# Patient Record
Sex: MALE | Race: OTHER | HISPANIC OR LATINO
[De-identification: names, ages, dates, MRNs, and addresses within clinical notes are randomized per-mention and may not be internally consistent; named-entity substitution may affect disease eponyms.]

---
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
## 2024-01-22 ENCOUNTER — MED ADMIN CHARGE (OUTPATIENT)
Age: 19
End: 2024-01-22

## 2024-01-22 ENCOUNTER — OUTPATIENT (OUTPATIENT)
Dept: OUTPATIENT SERVICES | Facility: HOSPITAL | Age: 19
LOS: 1 days | End: 2024-01-22
Payer: MEDICAID

## 2024-01-22 ENCOUNTER — APPOINTMENT (OUTPATIENT)
Dept: PEDIATRIC ADOLESCENT MEDICINE | Facility: CLINIC | Age: 19
End: 2024-01-22
Payer: MEDICAID

## 2024-01-22 VITALS
RESPIRATION RATE: 20 BRPM | SYSTOLIC BLOOD PRESSURE: 130 MMHG | HEIGHT: 70 IN | BODY MASS INDEX: 28.63 KG/M2 | WEIGHT: 200 LBS | HEART RATE: 76 BPM | TEMPERATURE: 98.1 F | DIASTOLIC BLOOD PRESSURE: 70 MMHG

## 2024-01-22 DIAGNOSIS — R41.89 OTHER SYMPTOMS AND SIGNS INVOLVING COGNITIVE FUNCTIONS AND AWARENESS: ICD-10-CM

## 2024-01-22 DIAGNOSIS — Z23 ENCOUNTER FOR IMMUNIZATION: ICD-10-CM

## 2024-01-22 DIAGNOSIS — F84.0 AUTISTIC DISORDER: ICD-10-CM

## 2024-01-22 DIAGNOSIS — F90.9 ATTENTION-DEFICIT HYPERACTIVITY DISORDER, UNSPECIFIED TYPE: ICD-10-CM

## 2024-01-22 DIAGNOSIS — Z00.00 ENCOUNTER FOR GENERAL ADULT MEDICAL EXAMINATION WITHOUT ABNORMAL FINDINGS: ICD-10-CM

## 2024-01-22 PROCEDURE — 80061 LIPID PANEL: CPT

## 2024-01-22 PROCEDURE — 82465 ASSAY BLD/SERUM CHOLESTEROL: CPT

## 2024-01-22 PROCEDURE — 90686 IIV4 VACC NO PRSV 0.5 ML IM: CPT

## 2024-01-22 PROCEDURE — 99214 OFFICE O/P EST MOD 30 MIN: CPT

## 2024-01-22 PROCEDURE — 84443 ASSAY THYROID STIM HORMONE: CPT

## 2024-01-22 PROCEDURE — 85027 COMPLETE CBC AUTOMATED: CPT

## 2024-01-22 PROCEDURE — 83036 HEMOGLOBIN GLYCOSYLATED A1C: CPT

## 2024-01-22 PROCEDURE — 36415 COLL VENOUS BLD VENIPUNCTURE: CPT

## 2024-01-22 NOTE — DISCUSSION/SUMMARY
[FreeTextEntry1] : 17 yo M with Autism, ADHD, and cognitive impairment. Had a CPE in may so not due yet. Needs assistance in psych/neuropsych eval. Has also not had blood work done in 3 years. PE unremarkable. Due for Flu shot.  Plan: -Psych referral given -Neuropsych referral given -Meet with Social work in  2 weeks at next visit, to assist if needed with appointments and any  -Labs: CBC, Lipid, A1C, TSH w/RT4 -Flu shot - Post vaccine care discussed & potential side effects reviewed - Tylenol every 4 hours prn for fever or pain and or Motrin every 6 hours prn for fever or pain -F/U in 2 weeks to review establishment of evaluation and services with psych or neuropsych as well as f/u on blood work  Caretaker expressed understanding of the plan and agrees. All questions were answered.

## 2024-01-22 NOTE — HISTORY OF PRESENT ILLNESS
[FreeTextEntry6] : 19 yo M with Autism and ADHD here for "check up" with grandmother who is pt's ayleen. Grandmother states pt will be hopefully enrolling into an  program next academic year and this year she has been doing her best to keep him busy with activities. He runs outside, likes to play soccer. Does not have friends though and spends much time with family.  Grandma is concerned that over the past couple of years pt has started speaking to himself and its progressively gotten worse. Schizophrenia and Bipolar disorder run in the family.  I spoke to Moo briefly alone- he feels safe at home, loves his grandma, denies suicidal ideations, denies homicidal ideations, and denies AVH.  Grandmother would like him professionally evaluated to best assess what his capabilities are and what she can do to best help him live his adult life.  Moo reports feeling well today and that he eats a balanced diet and gets exercise.

## 2024-01-23 DIAGNOSIS — R41.89 OTHER SYMPTOMS AND SIGNS INVOLVING COGNITIVE FUNCTIONS AND AWARENESS: ICD-10-CM

## 2024-01-23 DIAGNOSIS — F84.0 AUTISTIC DISORDER: ICD-10-CM

## 2024-01-23 DIAGNOSIS — F90.9 ATTENTION-DEFICIT HYPERACTIVITY DISORDER, UNSPECIFIED TYPE: ICD-10-CM

## 2024-01-23 LAB
BASOPHILS # BLD AUTO: 0.04 K/UL
BASOPHILS NFR BLD AUTO: 0.6 %
EOSINOPHIL # BLD AUTO: 0.15 K/UL
EOSINOPHIL NFR BLD AUTO: 2.4 %
HCT VFR BLD CALC: 49.8 %
HGB BLD-MCNC: 16.1 G/DL
IMM GRANULOCYTES NFR BLD AUTO: 0.3 %
LYMPHOCYTES # BLD AUTO: 2.25 K/UL
LYMPHOCYTES NFR BLD AUTO: 35.4 %
MAN DIFF?: NORMAL
MCHC RBC-ENTMCNC: 30.7 PG
MCHC RBC-ENTMCNC: 32.3 G/DL
MCV RBC AUTO: 95 FL
MONOCYTES # BLD AUTO: 0.74 K/UL
MONOCYTES NFR BLD AUTO: 11.6 %
NEUTROPHILS # BLD AUTO: 3.16 K/UL
NEUTROPHILS NFR BLD AUTO: 49.7 %
PLATELET # BLD AUTO: 205 K/UL
RBC # BLD: 5.24 M/UL
RBC # FLD: 11.9 %
WBC # FLD AUTO: 6.36 K/UL

## 2024-01-24 LAB
CHOLEST SERPL-MCNC: 166 MG/DL
CHOLEST SERPL-MCNC: 167 MG/DL
ESTIMATED AVERAGE GLUCOSE: 91 MG/DL
HBA1C MFR BLD HPLC: 4.8 %
HDLC SERPL-MCNC: 53 MG/DL
LDLC SERPL CALC-MCNC: 99 MG/DL
NONHDLC SERPL-MCNC: 114 MG/DL
TRIGL SERPL-MCNC: 76 MG/DL
TSH SERPL-ACNC: 1.4 UIU/ML

## 2024-02-05 ENCOUNTER — APPOINTMENT (OUTPATIENT)
Dept: PEDIATRIC ADOLESCENT MEDICINE | Facility: CLINIC | Age: 19
End: 2024-02-05

## 2024-03-11 ENCOUNTER — OUTPATIENT (OUTPATIENT)
Dept: OUTPATIENT SERVICES | Facility: HOSPITAL | Age: 19
LOS: 1 days | End: 2024-03-11
Payer: MEDICAID

## 2024-03-11 ENCOUNTER — APPOINTMENT (OUTPATIENT)
Dept: PEDIATRIC ADOLESCENT MEDICINE | Facility: CLINIC | Age: 19
End: 2024-03-11
Payer: MEDICAID

## 2024-03-11 VITALS
HEART RATE: 78 BPM | DIASTOLIC BLOOD PRESSURE: 59 MMHG | SYSTOLIC BLOOD PRESSURE: 89 MMHG | HEIGHT: 70 IN | WEIGHT: 198 LBS | TEMPERATURE: 96 F | RESPIRATION RATE: 20 BRPM | BODY MASS INDEX: 28.35 KG/M2

## 2024-03-11 DIAGNOSIS — Z00.00 ENCOUNTER FOR GENERAL ADULT MEDICAL EXAMINATION WITHOUT ABNORMAL FINDINGS: ICD-10-CM

## 2024-03-11 DIAGNOSIS — Z02.79 ENCOUNTER FOR ISSUE OF OTHER MEDICAL CERTIFICATE: ICD-10-CM

## 2024-03-11 DIAGNOSIS — Z71.9 COUNSELING, UNSPECIFIED: ICD-10-CM

## 2024-03-11 PROCEDURE — 99213 OFFICE O/P EST LOW 20 MIN: CPT

## 2024-03-11 NOTE — REVIEW OF SYSTEMS
[Fever] : no fever [Malaise] : no malaise [Difficulty with Sleep] : no difficulty with sleep [Headache] : no headache [Nasal Discharge] : no nasal discharge [Nasal Congestion] : no nasal congestion [Cough] : no cough [Congestion] : no congestion [Vomiting] : no vomiting [Diarrhea] : no diarrhea [Rash] : no rash

## 2024-03-11 NOTE — HISTORY OF PRESENT ILLNESS
[FreeTextEntry6] : 18-year-old M with ADHD here for follow up and to get forms filled out. Pt has been doing well. no complaints or concerns. He was last seen in January and had blood work done with referrals to Psych and Neuropsych given to him at that time to establish care for counseling and ADHD. He has not been able to see them yet. Forms today are for grandmother to have foster care over her granddaughter (Moo's 2y/o sister). Moo spends his time playing soccer and basketball at the park with his friend but would like to join a team. He is applying to [employer] in August and is currently studying for it.  He has been sleeping well, exercising daily in the morning and eating healthy.

## 2024-03-11 NOTE — BEGINNING OF VISIT
[Patient] : patient [Other: ____] : [unfilled] [Foster Parents/Guardian] : /guardian [FreeTextEntry1] : Grandmother

## 2024-03-11 NOTE — DISCUSSION/SUMMARY
[FreeTextEntry1] : 18-year-old M with ADHD here for follow up and has forms to be filled out. He has been doing well without complaints or concerns. He is getting adequate sleep, has a balanced diet and currently spends his time playing basketball, soccer and by studying to be an .  - Forms filled out and given to grandmother - Confirmed Pt still has referrals to Psych and Neuropsych - reviewed lab work in January  - Given list of intramural soccer/basketball leagues to participate in team sports per pt wishes  - RTC as needed or for WCC   Patient confirmed understanding and agreement with plan

## 2024-03-12 DIAGNOSIS — Z71.9 COUNSELING, UNSPECIFIED: ICD-10-CM

## 2024-03-12 DIAGNOSIS — Z02.79 ENCOUNTER FOR ISSUE OF OTHER MEDICAL CERTIFICATE: ICD-10-CM

## 2024-03-29 NOTE — ED ADULT NURSE NOTE - NS ED NOTE ABUSE RESPONSE YN
The patient is Stable - Low risk of patient condition declining or worsening         Progress made toward(s) clinical / shift goals:    Problem: Knowledge Deficit - Standard  Goal: Patient and family/care givers will demonstrate understanding of plan of care, disease process/condition, diagnostic tests and medications  Outcome: Progressing       Patient is not progressing towards the following goals:       Yes

## 2024-05-14 ENCOUNTER — OUTPATIENT (OUTPATIENT)
Dept: OUTPATIENT SERVICES | Facility: HOSPITAL | Age: 19
LOS: 1 days | End: 2024-05-14
Payer: MEDICAID

## 2024-05-14 ENCOUNTER — APPOINTMENT (OUTPATIENT)
Dept: NEUROPSYCHOLOGY | Facility: CLINIC | Age: 19
End: 2024-05-14

## 2024-05-14 DIAGNOSIS — F84.0 AUTISTIC DISORDER: ICD-10-CM

## 2024-05-14 DIAGNOSIS — G31.84 MILD COGNITIVE IMPAIRMENT OF UNCERTAIN OR UNKNOWN ETIOLOGY: ICD-10-CM

## 2024-05-14 PROCEDURE — 90837 PSYTX W PT 60 MINUTES: CPT | Mod: 95

## 2024-05-14 PROCEDURE — 90791 PSYCH DIAGNOSTIC EVALUATION: CPT | Mod: 95

## 2024-05-15 DIAGNOSIS — F84.0 AUTISTIC DISORDER: ICD-10-CM

## 2024-06-03 ENCOUNTER — OUTPATIENT (OUTPATIENT)
Dept: OUTPATIENT SERVICES | Facility: HOSPITAL | Age: 19
LOS: 1 days | End: 2024-06-03
Payer: MEDICAID

## 2024-06-03 ENCOUNTER — APPOINTMENT (OUTPATIENT)
Dept: NEUROPSYCHOLOGY | Facility: CLINIC | Age: 19
End: 2024-06-03

## 2024-06-03 DIAGNOSIS — G31.84 MILD COGNITIVE IMPAIRMENT OF UNCERTAIN OR UNKNOWN ETIOLOGY: ICD-10-CM

## 2024-06-03 PROCEDURE — 96132 NRPSYC TST EVAL PHYS/QHP 1ST: CPT

## 2024-06-03 PROCEDURE — 96138 PSYCL/NRPSYC TECH 1ST: CPT

## 2024-06-03 PROCEDURE — 96133 NRPSYC TST EVAL PHYS/QHP EA: CPT

## 2024-06-03 PROCEDURE — 96139 PSYCL/NRPSYC TST TECH EA: CPT

## 2024-06-04 DIAGNOSIS — G31.84 MILD COGNITIVE IMPAIRMENT OF UNCERTAIN OR UNKNOWN ETIOLOGY: ICD-10-CM

## 2024-07-17 ENCOUNTER — APPOINTMENT (OUTPATIENT)
Dept: NEUROPSYCHOLOGY | Facility: CLINIC | Age: 19
End: 2024-07-17

## 2024-07-17 ENCOUNTER — OUTPATIENT (OUTPATIENT)
Dept: OUTPATIENT SERVICES | Facility: HOSPITAL | Age: 19
LOS: 1 days | Discharge: HOME | End: 2024-07-17
Payer: MEDICAID

## 2024-07-17 DIAGNOSIS — G31.84 MILD COGNITIVE IMPAIRMENT OF UNCERTAIN OR UNKNOWN ETIOLOGY: ICD-10-CM

## 2024-07-17 PROCEDURE — 96133 NRPSYC TST EVAL PHYS/QHP EA: CPT

## 2024-07-17 PROCEDURE — 96132 NRPSYC TST EVAL PHYS/QHP 1ST: CPT

## 2024-07-18 DIAGNOSIS — G31.84 MILD COGNITIVE IMPAIRMENT OF UNCERTAIN OR UNKNOWN ETIOLOGY: ICD-10-CM

## 2024-09-18 ENCOUNTER — APPOINTMENT (OUTPATIENT)
Dept: PEDIATRIC ADOLESCENT MEDICINE | Facility: CLINIC | Age: 19
End: 2024-09-18

## 2024-11-15 ENCOUNTER — OUTPATIENT (OUTPATIENT)
Dept: OUTPATIENT SERVICES | Facility: HOSPITAL | Age: 19
LOS: 1 days | End: 2024-11-15
Payer: MEDICAID

## 2024-11-15 ENCOUNTER — APPOINTMENT (OUTPATIENT)
Dept: PEDIATRIC ADOLESCENT MEDICINE | Facility: CLINIC | Age: 19
End: 2024-11-15
Payer: MEDICAID

## 2024-11-15 VITALS
RESPIRATION RATE: 19 BRPM | BODY MASS INDEX: 27.49 KG/M2 | WEIGHT: 192 LBS | TEMPERATURE: 98.4 F | HEIGHT: 70 IN | DIASTOLIC BLOOD PRESSURE: 76 MMHG | SYSTOLIC BLOOD PRESSURE: 119 MMHG | HEART RATE: 58 BPM

## 2024-11-15 DIAGNOSIS — Z71.89 OTHER SPECIFIED COUNSELING: ICD-10-CM

## 2024-11-15 DIAGNOSIS — Z71.3 DIETARY COUNSELING AND SURVEILLANCE: ICD-10-CM

## 2024-11-15 DIAGNOSIS — R10.31 RIGHT LOWER QUADRANT PAIN: ICD-10-CM

## 2024-11-15 DIAGNOSIS — R10.32 RIGHT LOWER QUADRANT PAIN: ICD-10-CM

## 2024-11-15 DIAGNOSIS — Z00.00 ENCOUNTER FOR GENERAL ADULT MEDICAL EXAMINATION WITHOUT ABNORMAL FINDINGS: ICD-10-CM

## 2024-11-15 DIAGNOSIS — K59.00 CONSTIPATION, UNSPECIFIED: ICD-10-CM

## 2024-11-15 PROCEDURE — 99214 OFFICE O/P EST MOD 30 MIN: CPT | Mod: 25

## 2024-11-15 PROCEDURE — 99214 OFFICE O/P EST MOD 30 MIN: CPT

## 2024-11-15 RX ORDER — PSYLLIUM SEED
58.6 PACKET (EA) ORAL
Qty: 1 | Refills: 1 | Status: ACTIVE | COMMUNITY
Start: 2024-11-15 | End: 1900-01-01

## 2024-11-19 DIAGNOSIS — Z71.3 DIETARY COUNSELING AND SURVEILLANCE: ICD-10-CM

## 2024-11-19 DIAGNOSIS — K59.00 CONSTIPATION, UNSPECIFIED: ICD-10-CM

## 2024-11-19 DIAGNOSIS — Z87.19 PERSONAL HISTORY OF OTHER DISEASES OF THE DIGESTIVE SYSTEM: ICD-10-CM

## 2024-11-19 DIAGNOSIS — Z71.89 OTHER SPECIFIED COUNSELING: ICD-10-CM

## 2024-11-19 DIAGNOSIS — R10.31 RIGHT LOWER QUADRANT PAIN: ICD-10-CM

## 2024-11-20 ENCOUNTER — APPOINTMENT (OUTPATIENT)
Dept: PEDIATRIC ADOLESCENT MEDICINE | Facility: CLINIC | Age: 19
End: 2024-11-20

## 2024-12-11 ENCOUNTER — APPOINTMENT (OUTPATIENT)
Dept: PEDIATRIC ADOLESCENT MEDICINE | Facility: CLINIC | Age: 19
End: 2024-12-11

## 2024-12-22 NOTE — PHYSICAL EXAM
PT dc'd, vss, pt stable. Ambulated out of ER with family. DC instructions given, no questions or concerns at this time.    [NL] : warm, clear

## 2025-01-08 ENCOUNTER — APPOINTMENT (OUTPATIENT)
Dept: PEDIATRIC ADOLESCENT MEDICINE | Facility: CLINIC | Age: 20
End: 2025-01-08

## 2025-02-04 ENCOUNTER — APPOINTMENT (OUTPATIENT)
Dept: PEDIATRIC ADOLESCENT MEDICINE | Facility: CLINIC | Age: 20
End: 2025-02-04

## 2025-04-25 ENCOUNTER — APPOINTMENT (OUTPATIENT)
Dept: PEDIATRIC ADOLESCENT MEDICINE | Facility: CLINIC | Age: 20
End: 2025-04-25

## 2025-04-30 ENCOUNTER — OUTPATIENT (OUTPATIENT)
Dept: OUTPATIENT SERVICES | Facility: HOSPITAL | Age: 20
LOS: 1 days | End: 2025-04-30
Payer: MEDICAID

## 2025-04-30 ENCOUNTER — APPOINTMENT (OUTPATIENT)
Dept: PEDIATRIC ADOLESCENT MEDICINE | Facility: CLINIC | Age: 20
End: 2025-04-30
Payer: MEDICAID

## 2025-04-30 VITALS
SYSTOLIC BLOOD PRESSURE: 117 MMHG | RESPIRATION RATE: 20 BRPM | BODY MASS INDEX: 31.55 KG/M2 | TEMPERATURE: 98.2 F | HEIGHT: 69 IN | OXYGEN SATURATION: 97 % | DIASTOLIC BLOOD PRESSURE: 71 MMHG | WEIGHT: 213 LBS | HEART RATE: 72 BPM

## 2025-04-30 DIAGNOSIS — Z00.00 ENCOUNTER FOR GENERAL ADULT MEDICAL EXAMINATION WITHOUT ABNORMAL FINDINGS: ICD-10-CM

## 2025-04-30 DIAGNOSIS — Z71.89 OTHER SPECIFIED COUNSELING: ICD-10-CM

## 2025-04-30 DIAGNOSIS — R41.89 OTHER SYMPTOMS AND SIGNS INVOLVING COGNITIVE FUNCTIONS AND AWARENESS: ICD-10-CM

## 2025-04-30 DIAGNOSIS — Z02.79 ENCOUNTER FOR ISSUE OF OTHER MEDICAL CERTIFICATE: ICD-10-CM

## 2025-04-30 DIAGNOSIS — F90.9 ATTENTION-DEFICIT HYPERACTIVITY DISORDER, UNSPECIFIED TYPE: ICD-10-CM

## 2025-04-30 PROCEDURE — 99214 OFFICE O/P EST MOD 30 MIN: CPT | Mod: 25

## 2025-05-01 DIAGNOSIS — Z71.89 OTHER SPECIFIED COUNSELING: ICD-10-CM

## 2025-05-01 DIAGNOSIS — Z02.79 ENCOUNTER FOR ISSUE OF OTHER MEDICAL CERTIFICATE: ICD-10-CM

## 2025-08-13 ENCOUNTER — APPOINTMENT (OUTPATIENT)
Dept: NEUROPSYCHOLOGY | Facility: CLINIC | Age: 20
End: 2025-08-13